# Patient Record
Sex: FEMALE | Race: WHITE
[De-identification: names, ages, dates, MRNs, and addresses within clinical notes are randomized per-mention and may not be internally consistent; named-entity substitution may affect disease eponyms.]

---

## 2017-10-08 ENCOUNTER — HOSPITAL ENCOUNTER (EMERGENCY)
Dept: HOSPITAL 10 - E/R | Age: 32
LOS: 1 days | Discharge: HOME | End: 2017-10-09
Payer: COMMERCIAL

## 2017-10-08 VITALS
WEIGHT: 108.22 LBS | BODY MASS INDEX: 18.48 KG/M2 | WEIGHT: 108.22 LBS | HEIGHT: 64 IN | BODY MASS INDEX: 18.48 KG/M2 | HEIGHT: 64 IN

## 2017-10-08 DIAGNOSIS — O99.89: Primary | ICD-10-CM

## 2017-10-08 DIAGNOSIS — R10.84: ICD-10-CM

## 2017-10-08 LAB
ADD UMIC: YES
ALBUMIN SERPL-MCNC: 3.4 G/DL (ref 3.3–4.9)
ALBUMIN/GLOB SERPL: 0.94 {RATIO}
ALP SERPL-CCNC: 90 IU/L (ref 42–121)
ALT SERPL-CCNC: 35 IU/L (ref 13–69)
ANION GAP SERPL CALC-SCNC: 8 MMOL/L (ref 8–16)
AST SERPL-CCNC: 23 IU/L (ref 15–46)
BACTERIA #/AREA URNS HPF: (no result) /HPF
BASOPHILS # BLD AUTO: 0 10^3/UL (ref 0–0.1)
BASOPHILS NFR BLD: 0.4 % (ref 0–2)
BILIRUB DIRECT SERPL-MCNC: 0 MG/DL (ref 0–0.2)
BILIRUB SERPL-MCNC: 0.3 MG/DL (ref 0.2–1.3)
BUN SERPL-MCNC: 10 MG/DL (ref 7–20)
CALCIUM SERPL-MCNC: 9.1 MG/DL (ref 8.4–10.2)
CHLORIDE SERPL-SCNC: 107 MMOL/L (ref 97–110)
CO2 SERPL-SCNC: 26 MMOL/L (ref 21–31)
COLOR UR: YELLOW
CREAT SERPL-MCNC: 0.47 MG/DL (ref 0.44–1)
EOSINOPHIL # BLD: 0.2 10^3/UL (ref 0–0.5)
EOSINOPHIL NFR BLD: 1.7 % (ref 0–7)
ERYTHROCYTE [DISTWIDTH] IN BLOOD BY AUTOMATED COUNT: 12.9 % (ref 11.5–14.5)
GLOBULIN SER-MCNC: 3.6 G/DL (ref 1.3–3.2)
GLUCOSE SERPL-MCNC: 87 MG/DL (ref 70–220)
GLUCOSE UR STRIP-MCNC: NEGATIVE MG/DL
HCT VFR BLD CALC: 37.7 % (ref 37–47)
HGB BLD-MCNC: 12.5 G/DL (ref 12–16)
KETONES UR STRIP.AUTO-MCNC: NEGATIVE MG/DL
LYMPHOCYTES # BLD AUTO: 3.1 10^3/UL (ref 0.8–2.9)
LYMPHOCYTES NFR BLD AUTO: 26.9 % (ref 15–51)
MCH RBC QN AUTO: 30.7 PG (ref 29–33)
MCHC RBC AUTO-ENTMCNC: 33.2 G/DL (ref 32–37)
MCV RBC AUTO: 92.6 FL (ref 82–101)
MONOCYTES # BLD: 0.5 10^3/UL (ref 0.3–0.9)
MONOCYTES NFR BLD: 4.6 % (ref 0–11)
NEUTROPHILS # BLD: 7.5 10^3/UL (ref 1.6–7.5)
NEUTROPHILS NFR BLD AUTO: 65.5 % (ref 39–77)
NITRITE UR QL STRIP.AUTO: NEGATIVE MG/DL
NRBC # BLD MANUAL: 0 10^3/UL (ref 0–0)
NRBC BLD AUTO-RTO: 0 /100WBC (ref 0–0)
PLATELET # BLD: 259 10^3/UL (ref 140–415)
PMV BLD AUTO: 8.8 FL (ref 7.4–10.4)
POTASSIUM SERPL-SCNC: 3.9 MMOL/L (ref 3.5–5.1)
PROT SERPL-MCNC: 7 G/DL (ref 6.1–8.1)
RBC # BLD AUTO: 4.07 10^6/UL (ref 4.2–5.4)
RBC # UR AUTO: (no result) MG/DL
SODIUM SERPL-SCNC: 137 MMOL/L (ref 135–144)
SQUAMOUS #/AREA URNS HPF: (no result) /HPF
UR ASCORBIC ACID: NEGATIVE MG/DL
UR BILIRUBIN (DIP): NEGATIVE MG/DL
UR CLARITY: CLEAR
UR PH (DIP): 7 (ref 5–9)
UR RBC: 24 /HPF (ref 0–5)
UR SPECIFIC GRAVITY (DIP): 1.01 (ref 1–1.03)
UR TOTAL PROTEIN (DIP): NEGATIVE MG/DL
UROBILINOGEN UR STRIP-ACNC: NEGATIVE MG/DL
WBC # BLD AUTO: 11.4 10^3/UL (ref 4.8–10.8)
WBC # UR STRIP: NEGATIVE LEU/UL

## 2017-10-08 PROCEDURE — 80053 COMPREHEN METABOLIC PANEL: CPT

## 2017-10-08 PROCEDURE — 85025 COMPLETE CBC W/AUTO DIFF WBC: CPT

## 2017-10-08 PROCEDURE — 96374 THER/PROPH/DIAG INJ IV PUSH: CPT

## 2017-10-08 PROCEDURE — 83690 ASSAY OF LIPASE: CPT

## 2017-10-08 PROCEDURE — 96375 TX/PRO/DX INJ NEW DRUG ADDON: CPT

## 2017-10-08 PROCEDURE — 76830 TRANSVAGINAL US NON-OB: CPT

## 2017-10-08 PROCEDURE — 99285 EMERGENCY DEPT VISIT HI MDM: CPT

## 2017-10-08 PROCEDURE — 36415 COLL VENOUS BLD VENIPUNCTURE: CPT

## 2017-10-08 PROCEDURE — 81001 URINALYSIS AUTO W/SCOPE: CPT

## 2017-10-08 PROCEDURE — 74177 CT ABD & PELVIS W/CONTRAST: CPT

## 2017-10-08 PROCEDURE — 76856 US EXAM PELVIC COMPLETE: CPT

## 2017-10-08 PROCEDURE — 84702 CHORIONIC GONADOTROPIN TEST: CPT

## 2017-10-08 NOTE — RADRPT
AMENDMENT:

10/9/2017 1:36:10 AM Ajay Richardson 

The majority the endometrium is thin, measuring up to 2.4 mm. There is an error within the body of t
he report. A small part of the endometrium is thickened, where there is some endometrium fluid in th
e lower uterine segment. This measures up to 16 mm in thickness. It is unclear whether there may be 
some vascular flow in the region of the fluid.

 

PROCEDURE:   Ultrasound of the pelvis.

 

CLINICAL INDICATION:   Pain

 

TECHNIQUE:   Transabdominal and transvaginal ultrasound of the pelvis was performed to better evalua
te the pelvic viscera.

 

COMPARISON: No pertinent prior examinations were submitted for comparison.

 

FINDINGS:

 

LAST MENSTRUAL PERIOD: Unavailable

 

UTERUS:

Size: 13.6 x 7.2 x 10.9 cm. The uterine texture is heterogeneous. At least 2 fibroids are seen withi
n the myometrium both anteriorly and posteriorly near the fundus. These measure up to 3.8 and 2.4 cm
. The endometrium measures 2.4 mm which is thickened. Some echogenic fluid is seen within the endoce
rvical canal, measuring up to 1.5 x 1.6 cm. Some vascular flow is seen within the endometrium.

 

RIGHT OVARY:

The ovary is not visualized.  No adnexal masses are seen.

 

LEFT OVARY:

The ovary is not visualized.  No adnexal masses are seen.

 

CUL-DE-SAC: There is no abnormal free fluid.

 

IMPRESSION:

 

Thickened endometrium containing a small amount of echogenic fluid/hemorrhage. Vascular flow within 
the endometrium is suggestive of retained products of conception and / or infection if there is appr
opriate clinical history.

 

Enlarged and heterogeneous uterus presumably due to postpartum state and fibroids.

 

RPTAT: HIKT

_____________________________________________ 

.Ajay Richardson MD, MD           Date    Time 

Electronically viewed and signed by .Ajay Richardson MD, MD on 10/09/2017 01:38 

 

D:  10/09/2017 01:38  T:  10/09/2017 01:38

.T/

## 2017-10-08 NOTE — ERD
ER Documentation


Chief Complaint


Date/Time


DATE: 10/8/17 


TIME: 23:25


Chief Complaint


BIBA , s/p C-sectcion post op problem





HPI


Patient is a 32-year-old female with no medical problems who presents with 

abdominal pain.  She was brought in by ambulance.  She delivered her baby 4 

days ago via .  She has right lower quadrant pain which she describes 

as "severe burning" which lasts 10 seconds and goes away.  It is worse with 

movement.  She has no fevers.  She tried Percocet for pain.  She does not 

currently have a primary doctor and only has her OB doctor.  Upon review of old 

medical records this is the patient's first visit to the emergency department.





ROS


All systems reviewed and are negative except as per history of present illness.





Allergies


Allergies:  


Coded Allergies:  


     No Known Allergy (Unverified , 10/8/17)





PMhx/Soc


Medical and Surgical Hx:  pt denies Medical Hx


History of Surgery:  No


Anesthesia Reaction:  No


Hx Neurological Disorder:  No


Hx Respiratory Disorders:  No


Hx Cardiac Disorders:  No


Hx Psychiatric Problems:  No


Hx Miscellaneous Medical Probl:  No


Hx Alcohol Use:  No


Hx Substance Use:  No


Hx Tobacco Use:  No


Smoking Status:  Never smoker





FmHx


Family History:  No diabetes





Physical Exam


Vitals





Vital Signs








  Date Time  Temp Pulse Resp B/P Pulse Ox O2 Delivery O2 Flow Rate FiO2


 


10/8/17 20:46 98.6 81 18 133/62 98   








Physical Exam


Const: Moderate distress secondary to pain


Head:   Atraumatic 


Eyes:    Normal Conjunctiva


ENT:    Normal External Ears, Nose and Mouth.


Neck:               Full range of motion..~ No meningismus.


Resp:    Clear to auscultation bilaterally


Cardio:    Regular rate and rhythm, no murmurs


Abd:    Soft, diffuse tenderness to palpation without rebound but there is 

guarding in the right lower quadrant


Skin:    scar is clean, dry, and intact


Back:    No midline or flank tenderness


Ext:    No cyanosis, or edema


Neur:    Awake and alert


Psych:    Normal Mood and Affect


Result Diagram:  


10/8/17 2129                                                                   

             10/8/17 2129





Results 24 hrs





 Laboratory Tests








Test


  10/8/17


21:29


 


White Blood Count 11.410^3/ul 


 


Red Blood Count 4.0710^6/ul 


 


Hemoglobin 12.5g/dl 


 


Hematocrit 37.7% 


 


Mean Corpuscular Volume 92.6fl 


 


Mean Corpuscular Hemoglobin 30.7pg 


 


Mean Corpuscular Hemoglobin


Concent 33.2g/dl 


 


 


Red Cell Distribution Width 12.9% 


 


Platelet Count 97815^3/UL 


 


Mean Platelet Volume 8.8fl 


 


Neutrophils % 65.5% 


 


Lymphocytes % 26.9% 


 


Monocytes % 4.6% 


 


Eosinophils % 1.7% 


 


Basophils % 0.4% 


 


Nucleated Red Blood Cells % 0.0/100WBC 


 


Neutrophils # 7.510^3/ul 


 


Lymphocytes # 3.110^3/ul 


 


Monocytes # 0.510^3/ul 


 


Eosinophils # 0.210^3/ul 


 


Basophils # 0.010^3/ul 


 


Nucleated Red Blood Cells # 0.010^3/ul 


 


Urine Color YELLOW 


 


Urine Clarity CLEAR 


 


Urine pH 7.0 


 


Urine Specific Gravity 1.011 


 


Urine Ketones NEGATIVEmg/dL 


 


Urine Nitrite NEGATIVEmg/dL 


 


Urine Bilirubin NEGATIVEmg/dL 


 


Urine Urobilinogen NEGATIVEmg/dL 


 


Urine Leukocyte Esterase NEGATIVELeu/ul 


 


Urine Microscopic RBC 24/HPF 


 


Urine Microscopic WBC 5/HPF 


 


Urine Squamous Epithelial


Cells FEW/HPF 


 


 


Urine Bacteria FEW/HPF 


 


Urine Hemoglobin 3+mg/dL 


 


Urine Glucose NEGATIVEmg/dL 


 


Urine Total Protein NEGATIVEmg/dl 


 


Sodium Level 137mmol/L 


 


Potassium Level 3.9mmol/L 


 


Chloride Level 107mmol/L 


 


Carbon Dioxide Level 26mmol/L 


 


Anion Gap 8 


 


Blood Urea Nitrogen 10mg/dl 


 


Creatinine 0.47mg/dl 


 


Glucose Level 87mg/dl 


 


Calcium Level 9.1mg/dl 


 


Total Bilirubin 0.3mg/dl 


 


Direct Bilirubin 0.00mg/dl 


 


Indirect Bilirubin 0.3mg/dl 


 


Aspartate Amino Transf


(AST/SGOT) 23IU/L 


 


 


Alanine Aminotransferase


(ALT/SGPT) 35IU/L 


 


 


Alkaline Phosphatase 90IU/L 


 


Total Protein 7.0g/dl 


 


Albumin 3.4g/dl 


 


Globulin 3.60g/dl 


 


Albumin/Globulin Ratio 0.94 


 


Lipase 42U/L 








 Current Medications








 Medications


  (Trade)  Dose


 Ordered  Sig/Joyce


 Route


 PRN Reason  Start Time


 Stop Time Status Last Admin


Dose Admin


 


 Sodium Chloride


  (NS)  1,000 ml @ 


 1,000 mls/hr  Q1H STAT


 IV


   10/8/17 21:02


 10/8/17 22:01 DC 10/8/17 21:45


 


 


 Morphine Sulfate


  (morphine)  4 mg  ONCE  STAT


 IV


   10/8/17 21:02


 10/8/17 21:04 DC 10/8/17 21:45


 


 


 Ondansetron HCl


  (Zofran Inj)  4 mg  ONCE  STAT


 IV


   10/8/17 21:02


 10/8/17 21:04 DC 10/8/17 21:45


 











Procedures/MDM


CT abdomen pelvis is pending radiology read at this time.





Ultrasound of the pelvis shows no retained products of conception per radiology.





Patient is a 32-year-old female who recently delivered who presents with 

abdominal pain.  Laboratory studies are normal.  Ultrasound shows no retained 

products of conception in the uterus.  CT scan of the abdomen and pelvis is 

pending at this time.  If this is negative for intra-abdominal abscess or 

surgical process I believe that outpatient management would be appropriate.  

The patient will need to follow-up closely with the OB doctor within 24 hours 

for evaluation.  She has already had her appendix removed and I doubt 

appendicitis, cholecystitis, pancreatitis, or bowel obstruction.  She was given 

fluids, morphine, and Zofran for symptomatic relief.





Departure


Diagnosis:  


 Primary Impression:  


 Postoperative pain


Condition:  Fair


Patient Instructions:  Abdominal Pain, Unknown Cause, (Female), Post Op Wound 

Check, Pain


Referrals:  


Your OB doctor





Additional Instructions:  


Call your primary care doctor TOMORROW for an appointment during the next 1-2 

days.See the doctor sooner or return here if your condition worsens before your 

appointment time.











NAZARIO WADSWORTH MD Oct 8, 2017 23:27

## 2017-10-09 VITALS
TEMPERATURE: 98.3 F | HEART RATE: 87 BPM | RESPIRATION RATE: 14 BRPM | DIASTOLIC BLOOD PRESSURE: 76 MMHG | SYSTOLIC BLOOD PRESSURE: 118 MMHG

## 2017-10-09 NOTE — CONS
Date/Time of Note


Date/Time of Note


DATE: 10/9/17 


TIME: 00:48





Assessment/Plan


Assessment/Plan


Additional Assessment/Plan


POD#5 w/RLQ s/p RLTCS- pain likely neuropathic


->Pt appropriate for d/c home from OB perspective. No e/o abdominal infection 

or wound disruption. History and exam not c/w retained POCs. Thin EMS on U/S


->Recommend cool or warm compresses per pt preference, initiate improved pain 

regimen with NSAIDs given no contraindications, improved stool regimen given 

constipation may also be complicating pain. D/w pt that she may benefit from 

local topical pain-relieving agents although should avoid placing anything over 

incision


->Recommend pt f/up with Primary OB in the morning. May also benefit from 

medicine such as Gabapentin for relief of neuropathic pain if pain does not 

improve over time


->Requested pt be discharged with Rx for Ibuprofen 600mg PO q6 and Colace. 

Encouraged pt to add Miralax to stool regimen





Consultation Date/Type/Reason


Admit Date/Time





Date of Consultation:  Oct 8, 2017


Type of Consultation:  Obstetric


Reason for Consultation


Postop with RLQ pain


Referring Provider:  NAZARIO WADSWORTH MD





Hx of Present Illness


Pt is a 31yo  POD#5 s/p elective Repeat Low Transverse  Section on 

10/4 who presents with intermittent RLQ pain. Pt states she was discharged home 

from the hospital on POD#2 in stable condition s/p uncomplicated  

section. After arriving home, pt states she began having electric shocking/

burning type pain which has become worse over the last 2d. Pain comes for 

approx 10 seconds and resolves. Pain is exacerbated with movement. Has been 10/

10, nonradiating or migrating. Experienced numbness around entirety of incision 

after last C/S, now L half of incision has feeling and R side is still numb. Pt 

attempted to call her physician today however was unable to reach him or the 

covering provider thus came to the ED. Pt reports normal vaginal bleeding, 

denies blood clots, foul smelling vaginal discharge, severe cramping although 

experiences contractions with breastfeeding. Was discharged home with Percocet 

so has been taking that although had some Ibuprofen 600mg at home and took two 

tabs yesterday with little relief. Last stool this AM although was small. Prior 

to this, last stool was >5d ago. Has not been taking a stool softener. Denies 

fevers, chills, dysuria or poor appetite.





PROCEDURE:   Ultrasound of the pelvis.


 


CLINICAL INDICATION:   Pain


 


TECHNIQUE:   Transabdominal and transvaginal ultrasound of the pelvis was 

performed to better evaluate the pelvic viscera.


 


COMPARISON: No pertinent prior examinations were submitted for comparison.


 


FINDINGS:


 


LAST MENSTRUAL PERIOD: Unavailable


 


UTERUS:


Size: 13.6 x 7.2 x 10.9 cm. The uterine texture is heterogeneous. At least 2 

fibroids are seen within the myometrium both anteriorly and posteriorly near 

the fundus. These measure up to 3.8 and 2.4 cm. The endometrium measures 2.4 mm 

which is thickened. Some echogenic fluid is seen within the endocervical canal, 

measuring up to 1.5 x 1.6 cm. Some vascular flow is seen within the endometrium.


 


RIGHT OVARY:


The ovary is not visualized.  No adnexal masses are seen.


 


LEFT OVARY:


The ovary is not visualized.  No adnexal masses are seen.


 


CUL-DE-SAC: There is no abnormal free fluid.


 


IMPRESSION:


 


Thickened endometrium containing a small amount of echogenic fluid/hemorrhage. 

Vascular flow within the endometrium is suggestive of retained products of 

conception and / or infection if there is appropriate clinical history.


 


Enlarged and heterogeneous uterus presumably due to postpartum state and 

fibroids.


----------------------





PROCEDURE:    CT ABDOMEN/PELVIS WITH CONTRAST  


 


CLINICAL INDICATION:   32-year-old female with abdominal pain following 

 section. 


 


TECHNIQUE:   The study was performed utilizing a [employer] VCT 64-slice CT 

scanner.  Direct axial sections were obtained through the abdomen and pelvis 

with the use of 100 cc of Omnipaque-300 nonionic intravenous contrast material. 

Sagittal and coronal reformations were obtained. One or more of the following 

dose reduction techniques were utilized: automated exposure control, adjustment 

of the mA and/or kV according to patient's size or use of iterative 

reconstruction technique.  The images were reviewed on a PACS workstation.  CTD/

vol = 5.4 mGy; Total Exam DLP = 282.4 mGy-cm. 


 


COMPARISON:    None. 


 


FINDINGS:


 


There is trace bibasilar subsegmental atelectasis.  There is no evidence for 

significant pleural effusion.  The liver has a normal size and contour. There 

is a tiny ovoid hypodense focus within the right lobe of the liver on axial 

image 3-33 measuring 3 x 2 mm most likely representing a cyst. 6 x 4 mm not 

well characterized on the current examination. There is minimal intrahepatic 

biliary ductal dilatation. The gallbladder demonstrates no wall thickening nor 

pericholecystic fluid. No biliary stones are evident. The distal common bile 

duct measures approximately 4.3 mm. The pancreas is without areas of abnormal 

attenuation or contrast enhancement.  This spleen is identified and has a 

normal size without abnormal density or contrast enhancement. The adrenal 

glands are unremarkable. The kidneys are functional bilaterally. There is mild 

prominence of the renal collecting systems bilaterally. There is no evidence 

for nephroureterolithiasis. The urinary bladder contains urine. Retained 


stool identified within the colon without evidence for bowel obstruction. The 

periappendiceal region is without inflammatory changes. There is infiltration 

of the lower ventral abdominal/pelvic wall with subcutaneous gas consistent 

with recent surgery. The uterus is enlarged measuring 15.6 x 10.1 x 13.3 cm. 

There is a  section incisional defect ventrally. There is no 

significant free fluid or fluid collection. The aortoiliac vessels are without 

aneurysmal dilatation. The osseous structures are intact. 


 


IMPRESSION:


 


1.  Postoperative changes within the lower ventral abdominal/pelvic wall.


2.  Enlarged postpartum uterus.


3.  Mild bilateral hydronephrosis.


4.  Mild retained stool within the colon without obstruction.


5.  Minimal intrahepatic biliary ductal dilatation.


Constitutional:  No chills, No febrile


Respiratory:  No shortness of breath


Gastrointestinal:  constipation, pain (RLQ pain around R aspect of  

section scar), passing stool, 


   No nausea, No vomiting


Genitourinary:  bleeding


Skin:  bruising (around incision), 


   No skin lesions





Past Medical History


Medical History:  no pertinent history





Past Surgical History


Past Surgical Hx:  appendectomy, other (C/S x2)





Social History


Smoking Status:  Never smoker





Exam/Review of Systems


Vital Signs


Vitals





 Vital Signs








  Date Time  Temp Pulse Resp B/P Pulse Ox O2 Delivery O2 Flow Rate FiO2


 


10/8/17 20:46 98.6 81 18 133/62 98   











Exam


Gen: tearful on entry to room while sitting in wheelchair after bathroom use, 

smiling at end of exam


Abd: soft, NTND, firm fundus 1FB below umbilicus, unable to reproduce pain on 

abdominal exam (when asked, pain is localized to R apical region of incision)


Incision: c/d/i with ecchymoses around incision, no e/o wound separation or 

infection


Loan: peripad with min lochia rubra


Lower Ext: symmetric, nontender, no edema bilaterally





Results


Result Diagram:  


10/8/17 2129                                                                   

             10/8/17 2129





Results 24 hrs





Laboratory Tests








Test


  10/8/17


21:29


 


White Blood Count 11.4  H


 


Red Blood Count 4.07  L


 


Hemoglobin 12.5  


 


Hematocrit 37.7  


 


Mean Corpuscular Volume 92.6  


 


Mean Corpuscular Hemoglobin 30.7  


 


Mean Corpuscular Hemoglobin


Concent 33.2  


 


 


Red Cell Distribution Width 12.9  


 


Platelet Count 259  


 


Mean Platelet Volume 8.8  


 


Neutrophils % 65.5  


 


Lymphocytes % 26.9  


 


Monocytes % 4.6  


 


Eosinophils % 1.7  


 


Basophils % 0.4  


 


Nucleated Red Blood Cells % 0.0  


 


Neutrophils # 7.5  


 


Lymphocytes # 3.1  H


 


Monocytes # 0.5  


 


Eosinophils # 0.2  


 


Basophils # 0.0  


 


Nucleated Red Blood Cells # 0.0  


 


Urine Color YELLOW  


 


Urine Clarity CLEAR  


 


Urine pH 7.0  


 


Urine Specific Gravity 1.011  


 


Urine Ketones NEGATIVE  


 


Urine Nitrite NEGATIVE  


 


Urine Bilirubin NEGATIVE  


 


Urine Urobilinogen NEGATIVE  


 


Urine Leukocyte Esterase NEGATIVE  


 


Urine Microscopic RBC 24  H


 


Urine Microscopic WBC 5  


 


Urine Squamous Epithelial


Cells FEW  


 


 


Urine Bacteria FEW  A


 


Urine Hemoglobin 3+  H


 


Urine Glucose NEGATIVE  


 


Urine Total Protein NEGATIVE  


 


Sodium Level 137  


 


Potassium Level 3.9  


 


Chloride Level 107  


 


Carbon Dioxide Level 26  


 


Anion Gap 8  


 


Blood Urea Nitrogen 10  


 


Creatinine 0.47  


 


Glucose Level 87  


 


Calcium Level 9.1  


 


Total Bilirubin 0.3  


 


Direct Bilirubin 0.00  


 


Indirect Bilirubin 0.3  


 


Aspartate Amino Transf


(AST/SGOT) 23  


 


 


Alanine Aminotransferase


(ALT/SGPT) 35  


 


 


Alkaline Phosphatase 90  


 


Total Protein 7.0  


 


Albumin 3.4  


 


Globulin 3.60  H


 


Albumin/Globulin Ratio 0.94  


 


Lipase 42  


 


Beta HCG, Quantitative 373.6  

















ANDREA SPRINGER MD Oct 9, 2017 00:59

## 2017-10-09 NOTE — RADRPT
PROCEDURE:    CT ABDOMEN/PELVIS WITH CONTRAST  

 

CLINICAL INDICATION:   32-year-old female with abdominal pain following  section. 

 

TECHNIQUE:   The study was performed utilizing a [employer] VCT 64-slice CT scanner.  Direct axia
l sections were obtained through the abdomen and pelvis with the use of 100 cc of Omnipaque-300 briana
onic intravenous contrast material. Sagittal and coronal reformations were obtained. One or more of 
the following dose reduction techniques were utilized: automated exposure control, adjustment of the
 mA and/or kV according to patient's size or use of iterative reconstruction technique.  The images 
were reviewed on a PACS workstation.  CTD/vol = 5.4 mGy; Total Exam DLP = 282.4 mGy-cm. 

 

COMPARISON:    None. 

 

FINDINGS:

 

There is trace bibasilar subsegmental atelectasis.  There is no evidence for significant pleural eff
usion.  The liver has a normal size and contour. There is a tiny ovoid hypodense focus within the ri
ght lobe of the liver on axial image 3-33 measuring 3 x 2 mm most likely representing a cyst. 6 x 4 
mm not well characterized on the current examination. There is minimal intrahepatic biliary ductal d
ilatation. The gallbladder demonstrates no wall thickening nor pericholecystic fluid. No biliary sto
karla are evident. The distal common bile duct measures approximately 4.3 mm. The pancreas is without 
areas of abnormal attenuation or contrast enhancement.  This spleen is identified and has a normal s
ize without abnormal density or contrast enhancement. The adrenal glands are unremarkable. The kidne
ys are functional bilaterally. There is mild prominence of the renal collecting systems bilaterally.
 There is no evidence for nephroureterolithiasis. The urinary bladder contains urine. Retained 

stool identified within the colon without evidence for bowel obstruction. The periappendiceal region
 is without inflammatory changes. There is infiltration of the lower ventral abdominal/pelvic wall w
ith subcutaneous gas consistent with recent surgery. The uterus is enlarged measuring 15.6 x 10.1 x 
13.3 cm. There is a  section incisional defect ventrally. There is no significant free fluid
 or fluid collection. The aortoiliac vessels are without aneurysmal dilatation. The osseous structur
es are intact. 

 

IMPRESSION:

 

1.  Postoperative changes within the lower ventral abdominal/pelvic wall.

2.  Enlarged postpartum uterus.

3.  Mild bilateral hydronephrosis.

4.  Mild retained stool within the colon without obstruction.

5.  Minimal intrahepatic biliary ductal dilatation.

_____________________________________________ 

.Edis Elizabeth MD, MD           Date    Time 

Electronically viewed and signed by .Edis Elizabeth MD, MD on 10/09/2017 00:10 

 

D:  10/09/2017 00:10  T:  10/09/2017 00:10

.NORA/

## 2017-10-09 NOTE — EN
Date/Time of Note


Date/Time of Note


DATE: 10/9/17 


TIME: 00:45





ER Progress Note


Patient was evaluated by Dr. Mulligan here in the ER.  Dr. Mulligan feels 

patient stable for outpatient management.  Patient will be discharged home on 

Motrin and Colace per Dr. Mulligan's request.  Patient to follow-up with OB 

tomorrow.











MARIA ELENA GEORGES Oct 9, 2017 00:45

## 2018-06-04 ENCOUNTER — HOSPITAL ENCOUNTER (EMERGENCY)
Age: 33
Discharge: HOME | End: 2018-06-04

## 2018-06-04 ENCOUNTER — HOSPITAL ENCOUNTER (EMERGENCY)
Dept: HOSPITAL 91 - FTE | Age: 33
Discharge: HOME | End: 2018-06-04
Payer: COMMERCIAL

## 2018-06-04 DIAGNOSIS — N83.202: Primary | ICD-10-CM

## 2018-06-04 LAB
ADD MAN DIFF?: NO
ADD UMIC: NO
ANION GAP: 13 (ref 8–16)
BASOPHIL #: 0 10^3/UL (ref 0–0.1)
BASOPHILS %: 0.3 % (ref 0–2)
BLOOD UREA NITROGEN: 13 MG/DL (ref 7–20)
CALCIUM: 9.7 MG/DL (ref 8.4–10.2)
CARBON DIOXIDE: 30 MMOL/L (ref 21–31)
CHLORIDE: 106 MMOL/L (ref 97–110)
CREATININE: 0.47 MG/DL (ref 0.44–1)
EOSINOPHILS #: 0.2 10^3/UL (ref 0–0.5)
EOSINOPHILS %: 2.2 % (ref 0–7)
GLUCOSE: 99 MG/DL (ref 70–220)
HEMATOCRIT: 39.9 % (ref 37–47)
HEMOGLOBIN: 13.3 G/DL (ref 12–16)
LYMPHOCYTES #: 2.4 10^3/UL (ref 0.8–2.9)
LYMPHOCYTES %: 25 % (ref 15–51)
MEAN CORPUSCULAR HEMOGLOBIN: 30 PG (ref 29–33)
MEAN CORPUSCULAR HGB CONC: 33.3 G/DL (ref 32–37)
MEAN CORPUSCULAR VOLUME: 90.1 FL (ref 82–101)
MEAN PLATELET VOLUME: 9.7 FL (ref 7.4–10.4)
MONOCYTE #: 0.5 10^3/UL (ref 0.3–0.9)
MONOCYTES %: 5.4 % (ref 0–11)
NEUTROPHIL #: 6.4 10^3/UL (ref 1.6–7.5)
NEUTROPHILS %: 66.9 % (ref 39–77)
NUCLEATED RED BLOOD CELLS #: 0 10^3/UL (ref 0–0)
NUCLEATED RED BLOOD CELLS%: 0 /100WBC (ref 0–0)
PLATELET COUNT: 261 10^3/UL (ref 140–415)
POTASSIUM: 4 MMOL/L (ref 3.5–5.1)
RED BLOOD COUNT: 4.43 10^6/UL (ref 4.2–5.4)
RED CELL DISTRIBUTION WIDTH: 12 % (ref 11.5–14.5)
SODIUM: 145 MMOL/L (ref 135–144)
UR ASCORBIC ACID: NEGATIVE MG/DL
UR BILIRUBIN (DIP): NEGATIVE MG/DL
UR BLOOD (DIP): NEGATIVE MG/DL
UR CLARITY: CLEAR
UR COLOR: (no result)
UR GLUCOSE (DIP): NEGATIVE MG/DL
UR KETONES (DIP): NEGATIVE MG/DL
UR LEUKOCYTE ESTERASE (DIP): NEGATIVE LEU/UL
UR NITRITE (DIP): NEGATIVE MG/DL
UR PH (DIP): 7 (ref 5–9)
UR SPECIFIC GRAVITY (DIP): 1 (ref 1–1.03)
UR TOTAL PROTEIN (DIP): NEGATIVE MG/DL
UR UROBILINOGEN (DIP): NEGATIVE MG/DL
WHITE BLOOD COUNT: 9.5 10^3/UL (ref 4.8–10.8)

## 2018-06-04 PROCEDURE — 81003 URINALYSIS AUTO W/O SCOPE: CPT

## 2018-06-04 PROCEDURE — 76856 US EXAM PELVIC COMPLETE: CPT

## 2018-06-04 PROCEDURE — 85025 COMPLETE CBC W/AUTO DIFF WBC: CPT

## 2018-06-04 PROCEDURE — 87591 N.GONORRHOEAE DNA AMP PROB: CPT

## 2018-06-04 PROCEDURE — 36415 COLL VENOUS BLD VENIPUNCTURE: CPT

## 2018-06-04 PROCEDURE — 81025 URINE PREGNANCY TEST: CPT

## 2018-06-04 PROCEDURE — 99284 EMERGENCY DEPT VISIT MOD MDM: CPT

## 2018-06-04 PROCEDURE — 80048 BASIC METABOLIC PNL TOTAL CA: CPT

## 2018-06-05 LAB — LABORATORY COMMENT REPORT: (no result)
